# Patient Record
Sex: FEMALE | Race: WHITE
[De-identification: names, ages, dates, MRNs, and addresses within clinical notes are randomized per-mention and may not be internally consistent; named-entity substitution may affect disease eponyms.]

---

## 2024-03-13 ENCOUNTER — HOSPITAL ENCOUNTER
Dept: HOSPITAL 101 - LAB | Age: 59
Discharge: HOME | End: 2024-03-13
Payer: COMMERCIAL

## 2024-03-13 DIAGNOSIS — L40.0: Primary | ICD-10-CM

## 2024-03-13 DIAGNOSIS — Z79.899: ICD-10-CM

## 2024-03-13 LAB
ADD MANUAL DIFF: NO
ALANINE AMINOTRANSFERASE: 26 U/L (ref 14–59)
ALBUMIN GLOBULIN RATIO: 0.9
ALBUMIN LEVEL: 3.5 G/DL (ref 3.4–5)
ALKALINE PHOSPHATASE: 61 U/L (ref 46–116)
ANION GAP: 12.6
ASPARTATE AMINO TRANSFERASE: 20 U/L (ref 15–37)
BLOOD UREA NITROGEN: 13 MG/DL (ref 7–18)
CALCIUM: 8.7 MG/DL (ref 8.5–10.1)
CARBON DIOXIDE: 29.8 MMOL/L (ref 21–32)
CHLORIDE: 104 MMOL/L (ref 98–107)
CHOLESTEROL: 268 MG/DL (ref ?–200)
ESTIMATED GFR (AFRICAN AMERICA: >60 (ref 60–?)
ESTIMATED GFR (NON-AFRICAN AME: >60 (ref 60–?)
GLOBULIN: 4 G/DL
GLUCOSE: 94 MG/DL (ref 74–106)
HDL CHOLESTEROL: 45 MG/DL (ref 40–60)
HEMATOCRIT: 39.6 % (ref 36–48)
HEMOGLOBIN: 13 G/DL (ref 12–16)
IMMATURE GRANULOCYTES ABS AUTO: 0.01 10^3/UL (ref 0–0.03)
IMMATURE GRANULOCYTES PCT AUTO: 0.1 % (ref 0–0.5)
LYMPHOCYTES  ABSOLUTE AUTO: 2.8 10^3/UL (ref 1.2–3.8)
MCV RBC: 94.7 FL (ref 81–99)
MEAN CORPUSCULAR HEMOGLOBIN: 31.1 PG (ref 26.7–34)
MEAN CORPUSCULAR HGB CONC: 32.8 G/DL (ref 29.9–35.2)
PLATELET COUNT: 341 10^3/UL (ref 150–450)
POTASSIUM: 3.4 MMOL/L (ref 3.5–5.1)
RED BLOOD COUNT: 4.18 10^6/UL (ref 4.2–5.4)
SODIUM: 143 MMOL/L (ref 136–145)
TOTAL PROTEIN: 7.5 G/DL (ref 6.4–8.2)
TRIGLYCERIDES: 478 MG/DL (ref ?–150)
VLDL CHOLESTEROL: 95.6 MG/DL
WHITE BLOOD COUNT: 8.1 10^3/UL (ref 4–11)

## 2024-03-13 PROCEDURE — 86480 TB TEST CELL IMMUN MEASURE: CPT

## 2024-03-13 PROCEDURE — 80053 COMPREHEN METABOLIC PANEL: CPT

## 2024-03-13 PROCEDURE — 85025 COMPLETE CBC W/AUTO DIFF WBC: CPT

## 2024-03-13 PROCEDURE — 80061 LIPID PANEL: CPT

## 2024-03-13 PROCEDURE — 36415 COLL VENOUS BLD VENIPUNCTURE: CPT

## 2024-03-13 PROCEDURE — 83721 ASSAY OF BLOOD LIPOPROTEIN: CPT

## 2024-04-15 ENCOUNTER — HOSPITAL ENCOUNTER (OUTPATIENT)
Dept: HOSPITAL 101 - ER | Age: 59
Setting detail: OBSERVATION
LOS: 1 days | Discharge: HOME | End: 2024-04-16
Payer: COMMERCIAL

## 2024-04-15 VITALS — DIASTOLIC BLOOD PRESSURE: 92 MMHG | OXYGEN SATURATION: 100 % | SYSTOLIC BLOOD PRESSURE: 159 MMHG

## 2024-04-15 VITALS — SYSTOLIC BLOOD PRESSURE: 171 MMHG | DIASTOLIC BLOOD PRESSURE: 85 MMHG | OXYGEN SATURATION: 99 %

## 2024-04-15 VITALS — HEART RATE: 96 BPM

## 2024-04-15 VITALS
OXYGEN SATURATION: 95 % | SYSTOLIC BLOOD PRESSURE: 154 MMHG | DIASTOLIC BLOOD PRESSURE: 74 MMHG | TEMPERATURE: 98.1 F | HEART RATE: 72 BPM

## 2024-04-15 VITALS
SYSTOLIC BLOOD PRESSURE: 115 MMHG | DIASTOLIC BLOOD PRESSURE: 72 MMHG | HEART RATE: 88 BPM | TEMPERATURE: 98.24 F | OXYGEN SATURATION: 95 %

## 2024-04-15 VITALS
TEMPERATURE: 97.7 F | OXYGEN SATURATION: 98 % | HEART RATE: 67 BPM | DIASTOLIC BLOOD PRESSURE: 80 MMHG | SYSTOLIC BLOOD PRESSURE: 157 MMHG

## 2024-04-15 VITALS — DIASTOLIC BLOOD PRESSURE: 73 MMHG | HEART RATE: 85 BPM | SYSTOLIC BLOOD PRESSURE: 106 MMHG | OXYGEN SATURATION: 99 %

## 2024-04-15 VITALS
TEMPERATURE: 98.06 F | DIASTOLIC BLOOD PRESSURE: 88 MMHG | SYSTOLIC BLOOD PRESSURE: 153 MMHG | OXYGEN SATURATION: 97 % | HEART RATE: 90 BPM

## 2024-04-15 VITALS — SYSTOLIC BLOOD PRESSURE: 118 MMHG | DIASTOLIC BLOOD PRESSURE: 72 MMHG

## 2024-04-15 VITALS — DIASTOLIC BLOOD PRESSURE: 73 MMHG | SYSTOLIC BLOOD PRESSURE: 112 MMHG

## 2024-04-15 VITALS — SYSTOLIC BLOOD PRESSURE: 143 MMHG | OXYGEN SATURATION: 99 % | HEART RATE: 78 BPM | DIASTOLIC BLOOD PRESSURE: 89 MMHG

## 2024-04-15 VITALS — HEART RATE: 67 BPM | DIASTOLIC BLOOD PRESSURE: 71 MMHG | SYSTOLIC BLOOD PRESSURE: 117 MMHG | OXYGEN SATURATION: 100 %

## 2024-04-15 VITALS — OXYGEN SATURATION: 99 %

## 2024-04-15 VITALS — HEART RATE: 69 BPM | OXYGEN SATURATION: 98 % | DIASTOLIC BLOOD PRESSURE: 76 MMHG | SYSTOLIC BLOOD PRESSURE: 114 MMHG

## 2024-04-15 VITALS — HEART RATE: 82 BPM

## 2024-04-15 VITALS — HEART RATE: 72 BPM | OXYGEN SATURATION: 99 % | SYSTOLIC BLOOD PRESSURE: 150 MMHG | DIASTOLIC BLOOD PRESSURE: 79 MMHG

## 2024-04-15 VITALS — HEART RATE: 103 BPM

## 2024-04-15 VITALS — HEART RATE: 90 BPM

## 2024-04-15 VITALS — HEART RATE: 85 BPM

## 2024-04-15 VITALS
OXYGEN SATURATION: 99 % | TEMPERATURE: 97.8 F | DIASTOLIC BLOOD PRESSURE: 92 MMHG | HEART RATE: 78 BPM | SYSTOLIC BLOOD PRESSURE: 159 MMHG

## 2024-04-15 VITALS — OXYGEN SATURATION: 98 % | DIASTOLIC BLOOD PRESSURE: 78 MMHG | HEART RATE: 95 BPM | SYSTOLIC BLOOD PRESSURE: 122 MMHG

## 2024-04-15 VITALS — OXYGEN SATURATION: 97 %

## 2024-04-15 VITALS — HEART RATE: 80 BPM

## 2024-04-15 VITALS — HEART RATE: 88 BPM

## 2024-04-15 VITALS — OXYGEN SATURATION: 99 % | HEART RATE: 66 BPM

## 2024-04-15 VITALS — BODY MASS INDEX: 25 KG/M2 | BODY MASS INDEX: 26.2 KG/M2

## 2024-04-15 VITALS — OXYGEN SATURATION: 98 %

## 2024-04-15 DIAGNOSIS — I10: ICD-10-CM

## 2024-04-15 DIAGNOSIS — R06.00: ICD-10-CM

## 2024-04-15 DIAGNOSIS — R07.89: Primary | ICD-10-CM

## 2024-04-15 DIAGNOSIS — Z79.899: ICD-10-CM

## 2024-04-15 DIAGNOSIS — R94.6: ICD-10-CM

## 2024-04-15 DIAGNOSIS — Z82.49: ICD-10-CM

## 2024-04-15 LAB
ADD MANUAL DIFF: NO
ANION GAP: 19.7
BLOOD UREA NITROGEN: 17 MG/DL (ref 7–18)
CALCIUM: 9.4 MG/DL (ref 8.5–10.1)
CARBON DIOXIDE: 22 MMOL/L (ref 21–32)
CHLORIDE: 100 MMOL/L (ref 98–107)
ESTIMATED GFR (AFRICAN AMERICA: >60 (ref 60–?)
ESTIMATED GFR (NON-AFRICAN AME: >60 (ref 60–?)
FREE T3: 1.61 PG/ML (ref 2.18–3.98)
GLUCOSE: 82 MG/DL (ref 74–106)
HEMATOCRIT: 42.5 % (ref 36–48)
HEMOGLOBIN: 13.8 G/DL (ref 12–16)
IMMATURE GRANULOCYTES ABS AUTO: 0.04 10^3/UL (ref 0–0.03)
IMMATURE GRANULOCYTES PCT AUTO: 0.4 % (ref 0–0.5)
LYMPHOCYTES  ABSOLUTE AUTO: 2.8 10^3/UL (ref 1.2–3.8)
MAGNESIUM: 2 MG/DL (ref 1.8–2.4)
MCV RBC: 95.3 FL (ref 81–99)
MEAN CORPUSCULAR HEMOGLOBIN: 30.9 PG (ref 26.7–34)
MEAN CORPUSCULAR HGB CONC: 32.5 G/DL (ref 29.9–35.2)
NT PRO B TYPE NATRIURETIC PEPT: 22 PG/ML (ref ?–900)
PLATELET COUNT: 359 10^3/UL (ref 150–450)
POTASSIUM: 3.7 MMOL/L (ref 3.5–5.1)
RED BLOOD COUNT: 4.46 10^6/UL (ref 4.2–5.4)
SODIUM: 138 MMOL/L (ref 136–145)
THYROID STIMULATING HORMONE: 1.54 UIU/ML (ref 0.36–3.74)
WHITE BLOOD COUNT: 9.1 10^3/UL (ref 4–11)

## 2024-04-15 PROCEDURE — 94761 N-INVAS EAR/PLS OXIMETRY MLT: CPT

## 2024-04-15 PROCEDURE — 93005 ELECTROCARDIOGRAM TRACING: CPT

## 2024-04-15 PROCEDURE — 96374 THER/PROPH/DIAG INJ IV PUSH: CPT

## 2024-04-15 PROCEDURE — 83735 ASSAY OF MAGNESIUM: CPT

## 2024-04-15 PROCEDURE — 84436 ASSAY OF TOTAL THYROXINE: CPT

## 2024-04-15 PROCEDURE — 84481 FREE ASSAY (FT-3): CPT

## 2024-04-15 PROCEDURE — 93306 TTE W/DOPPLER COMPLETE: CPT

## 2024-04-15 PROCEDURE — 84443 ASSAY THYROID STIM HORMONE: CPT

## 2024-04-15 PROCEDURE — 96375 TX/PRO/DX INJ NEW DRUG ADDON: CPT

## 2024-04-15 PROCEDURE — 83880 ASSAY OF NATRIURETIC PEPTIDE: CPT

## 2024-04-15 PROCEDURE — 71045 X-RAY EXAM CHEST 1 VIEW: CPT

## 2024-04-15 PROCEDURE — 36415 COLL VENOUS BLD VENIPUNCTURE: CPT

## 2024-04-15 PROCEDURE — 80048 BASIC METABOLIC PNL TOTAL CA: CPT

## 2024-04-15 PROCEDURE — 84484 ASSAY OF TROPONIN QUANT: CPT

## 2024-04-15 PROCEDURE — G0378 HOSPITAL OBSERVATION PER HR: HCPCS

## 2024-04-15 PROCEDURE — 99285 EMERGENCY DEPT VISIT HI MDM: CPT

## 2024-04-15 PROCEDURE — 85025 COMPLETE CBC W/AUTO DIFF WBC: CPT

## 2024-04-15 RX ADMIN — DEXAMETHASONE SODIUM PHOSPHATE 10 MG: 10 INJECTION INTRAMUSCULAR; INTRAVENOUS at 20:00

## 2024-04-15 RX ADMIN — ASPIRIN 81 MG 324 MG: 81 TABLET ORAL at 11:12

## 2024-04-15 RX ADMIN — CARVEDILOL 3.12 MG: 3.12 TABLET, FILM COATED ORAL at 21:19

## 2024-04-15 RX ADMIN — PANTOPRAZOLE SODIUM 40 MG: 40 INJECTION, POWDER, FOR SOLUTION INTRAVENOUS at 20:00

## 2024-04-15 RX ADMIN — NITROGLYCERIN 0.4 MG: 0.4 TABLET SUBLINGUAL at 11:12

## 2024-04-16 VITALS
DIASTOLIC BLOOD PRESSURE: 71 MMHG | HEART RATE: 84 BPM | SYSTOLIC BLOOD PRESSURE: 114 MMHG | TEMPERATURE: 97.52 F | OXYGEN SATURATION: 93 %

## 2024-04-16 VITALS — HEART RATE: 86 BPM

## 2024-04-16 VITALS — HEART RATE: 79 BPM

## 2024-04-16 VITALS — HEART RATE: 82 BPM

## 2024-04-16 VITALS — OXYGEN SATURATION: 94 %

## 2024-04-16 VITALS — HEART RATE: 84 BPM

## 2024-04-16 LAB
ADD MANUAL DIFF: NO
ANION GAP: 15.9
BLOOD UREA NITROGEN: 12 MG/DL (ref 7–18)
CALCIUM: 9.6 MG/DL (ref 8.5–10.1)
CARBON DIOXIDE: 23.2 MMOL/L (ref 21–32)
CHLORIDE: 102 MMOL/L (ref 98–107)
ESTIMATED GFR (AFRICAN AMERICA: >60 (ref 60–?)
ESTIMATED GFR (NON-AFRICAN AME: >60 (ref 60–?)
GLUCOSE: 165 MG/DL (ref 74–106)
HEMATOCRIT: 41.3 % (ref 36–48)
HEMOGLOBIN: 13.6 G/DL (ref 12–16)
IMMATURE GRANULOCYTES ABS AUTO: 0.07 10^3/UL (ref 0–0.03)
IMMATURE GRANULOCYTES PCT AUTO: 0.6 % (ref 0–0.5)
LYMPHOCYTES  ABSOLUTE AUTO: 0.8 10^3/UL (ref 1.2–3.8)
MCV RBC: 94.3 FL (ref 81–99)
MEAN CORPUSCULAR HEMOGLOBIN: 31.1 PG (ref 26.7–34)
MEAN CORPUSCULAR HGB CONC: 32.9 G/DL (ref 29.9–35.2)
PLATELET COUNT: 349 10^3/UL (ref 150–450)
POTASSIUM: 4.1 MMOL/L (ref 3.5–5.1)
RED BLOOD COUNT: 4.38 10^6/UL (ref 4.2–5.4)
SODIUM: 137 MMOL/L (ref 136–145)
WHITE BLOOD COUNT: 11.3 10^3/UL (ref 4–11)

## 2024-04-29 ENCOUNTER — OFFICE VISIT (OUTPATIENT)
Dept: CARDIOLOGY | Facility: CLINIC | Age: 59
End: 2024-04-29
Payer: MEDICAID

## 2024-04-29 VITALS
WEIGHT: 133 LBS | DIASTOLIC BLOOD PRESSURE: 72 MMHG | BODY MASS INDEX: 25.11 KG/M2 | SYSTOLIC BLOOD PRESSURE: 160 MMHG | HEART RATE: 68 BPM | HEIGHT: 61 IN

## 2024-04-29 DIAGNOSIS — L40.9 PSORIASIS: ICD-10-CM

## 2024-04-29 DIAGNOSIS — E78.1 HYPERTRIGLYCERIDEMIA: ICD-10-CM

## 2024-04-29 DIAGNOSIS — R07.89 OTHER CHEST PAIN: ICD-10-CM

## 2024-04-29 DIAGNOSIS — Z78.9 NEVER SMOKED TOBACCO: ICD-10-CM

## 2024-04-29 DIAGNOSIS — R03.0 ELEVATED BP WITHOUT DIAGNOSIS OF HYPERTENSION: ICD-10-CM

## 2024-04-29 PROBLEM — E78.5 HYPERLIPIDEMIA: Status: ACTIVE | Noted: 2024-04-29

## 2024-04-29 PROCEDURE — 93000 ELECTROCARDIOGRAM COMPLETE: CPT | Performed by: INTERNAL MEDICINE

## 2024-04-29 PROCEDURE — 99204 OFFICE O/P NEW MOD 45 MIN: CPT | Performed by: INTERNAL MEDICINE

## 2024-04-29 RX ORDER — GLUCOSAM/CHONDRO/HERB 149/HYAL 750-100 MG
2000 TABLET ORAL 2 TIMES DAILY
Qty: 360 CAPSULE | Refills: 3 | Status: SHIPPED | OUTPATIENT
Start: 2024-04-29 | End: 2025-04-29

## 2024-04-29 RX ORDER — ASPIRIN 81 MG/1
81 TABLET ORAL DAILY
COMMUNITY
Start: 2024-04-16

## 2024-04-29 RX ORDER — GUSELKUMAB 100 MG/ML
100 INJECTION SUBCUTANEOUS
COMMUNITY
Start: 2022-10-25

## 2024-04-29 NOTE — LETTER
April 30, 2024     Rodolfo Bettencourt MD  [address] [facility] [address]  [place] [address]    Patient: Gerda Demarco   YOB: 1965   Date of Visit: 4/29/2024       Dear Dr. Rodolfo Bettencourt MD:    Thank you for referring Gerda Demarco to me for evaluation. Below are my notes for this consultation.  If you have questions, please do not hesitate to call me. I look forward to following your patient along with you.       Sincerely,     Kailey Quezada MD      CC: No Recipients  ______________________________________________________________________________________    Referred by Dr.Michael Bettencourt for New Patient Visit (Chest pain)     History Of Present Illness:    Gerda Demarco is a 58 y.o. female presenting with history of chest pain.  Accompanied by  to the office.  Will patient and  have been modifying their lifestyle, becoming more heart healthy, exercising more, they both lost weight.  2 weeks ago patient noted anterior chest discomfort which she describes as a pressure sensation, lasting several minutes,  says that she looked quite uncomfortable sitting in a chair, for quite some time, she reports that the discomfort then went into her jaw, finally they decided to seek emergent medical attention, cardiac biomarkers were negative EKG was negative, she was observed overnight, on the schedule for an outpatient stress test which is to be done on 5/7/2025, and the diagnosis they gave me is she had pleurisy.  Chest pain was not sharp and it was not worse with deep inspiration,  reports that the chest pain will get better when she laid down and it was worse when she sat up or leaning forward.  No unaccustomed physical activity history.  Has history of psoriasis and hypertriglyceridemia.  No family history of premature coronary artery disease  Reviewed notes by primary MD Dr. Kasper dated 4/24/2025.  Reviewed recent laboratory data, hemoglobin 13.8 hematocrit 41 platelets 349  "glucose 165 sodium 137 potassium 4.1 GFR greater than 60, these labs were from 2024.  NT proBNP level was 22 troponin was negative .  Free T31.61 Which is below normal, reference range being 2.18-3.98, magnesium 2.0.  T4 was normal at 7.7 TSH 1.54 LDL cholesterol 97, triglycerides 478 total cholesterol 268 HDL 45 total cholesterol to HDL ratio 6.0    Echocardiogram was reported to show normal LVEF of 60 to 65% grossly normal valves normal right-sided pressures and no pericardial effusion..      Past Medical History:  She has no past medical history on file.    Past Surgical History:  She has a past surgical history that includes  section, classic.      Social History:  She reports that she has never smoked. She has never used smokeless tobacco. She reports that she does not currently use alcohol. She reports that she does not use drugs.    Family History:  Family History   Problem Relation Name Age of Onset   • Breast cancer Mother     • Diabetes Mother     • Dementia Father     • Kidney failure Father     • Migraines Sister     • Hypertension Sister          Allergies:  Patient has no known allergies.    Outpatient Medications:  Current Outpatient Medications   Medication Instructions   • aspirin 81 mg, oral, Daily   • omega 3-dha-epa-fish oil (Fish OiL) 1,000 mg (120 mg-180 mg) capsule 2,000 mg, oral, 2 times daily   • Tremfya 100 mg, subcutaneous, Every 8 weeks        Last Recorded Vitals:  Vitals:    24 1529 24 1533 24 1640 24 1641   BP: (!) 190/100 160/90 154/72 160/72   BP Location: Left arm Right arm Right arm Left arm   Patient Position: Sitting Sitting Sitting Sitting   Pulse: 68 68     Weight: 60.3 kg (133 lb)      Height: 1.549 m (5' 1\")          Physical Exam:    GENERAL APPEARANCE: Well developed, well nourished, in no acute distress.  CHEST: Symmetric and non-tender.  INTEGUMENT: Skin warm and dry, without gross excoriationis or lesions.  HEENT: No gross " abnormalities, no jugular venous distention no carotid bruit or scleral icterus  NECK: Supple, no JVD, no bruit. Thyroid not palpable. Carotid upstrokes normal.  NEURO/PSHCY: Alert and oriented x3; appropriate behavior and responses and responses, with normal balance and coordination  LUNGS: Clear to auscultation bilaterally; normal respiratory effort.  HEART: Rate and rhythm regular with no evident murmur; no gallop appreciated. There are no rubs, clicks or heaves.   ABDOMEN: Soft, nontender, no masses  or bruits.   MUSCULOSKELETAL: No obvious deformity identified  EXTREMITIES: Warm  There is no edema noted.  PERIPHERAL VASCULAR: Pulses present and equally palpable; 2+ throughout.          Labs reviewed today : CBC, basic metabolic profile, thyroid function panel, lipid profile and comprehensive profile.    Assessment/Plan  Diagnoses and all orders for this visit:  Other chest pain  -     Follow Up In Cardiology; Future  -     ECG 12 Lead  BMI 25.0-25.9,adult  Never smoked tobacco  Hypertriglyceridemia  -     omega 3-dha-epa-fish oil (Fish OiL) 1,000 mg (120 mg-180 mg) capsule; Take 2 capsules (2,000 mg) by mouth 2 times a day.  Psoriasis  Elevated BP without diagnosis of hypertension      1. Other chest pain  Follow Up In Cardiology    ECG 12 Lead      2. BMI 25.0-25.9,adult        3. Never smoked tobacco        4. Hypertriglyceridemia  omega 3-dha-epa-fish oil (Fish OiL) 1,000 mg (120 mg-180 mg) capsule      5. Psoriasis        6. Elevated BP without diagnosis of hypertension           Clinical decision makin.  Postmenopausal female with chest discomfort that has several features of angina pectoris, without objective evidence of ischemia.  2.  Her symptoms have not recurred  3.  Her echo is reported to be normal, done on 4/15/2024.  4.  She has elevated triglycerides  5.  She is on immune modulating agent for psoriasis.  6.  Her blood pressure is elevated, rechecked at 160s over 70s, no significant  difference between upper extremities, she reports that home blood pressures are in the 120s systolic, and that she has whitecoat hypertension.    I recommendations are that she continue to monitor her blood pressures outside the office environment, she should start fish oil 2 g p.o. twice daily as tolerated, she should undergo her treadmill stress test as already scheduled, and I will see her in follow-up after testing.  She understands the dynamic nature of coronary artery disease and the importance of seeking prompt medical attention if symptoms recur or change.  Clinically there is no suspicion for pericarditis or pleurisy.    We also talked about coronary calcium scoring, I do not know how much that test will add to our medical management.    Thank you for allowing me to participate in Gerda's care, please do not hesitate to call if further questions arise,  Sincerely,    Kailey Quezada MD [facility]  Provider Attestation - Scribe documentation    All medical record entries made by the Scribe were at my direction and personally dictated by me. I have reviewed the chart and agree that the record accurately reflects my personal performance of the history, physical exam, discussion and plan.

## 2024-04-29 NOTE — PROGRESS NOTES
Referred by Dr.Michael Bettencourt for New Patient Visit (Chest pain)     History Of Present Illness:    Gerda Demraco is a 58 y.o. female presenting with history of chest pain.  Accompanied by  to the office.  Will patient and  have been modifying their lifestyle, becoming more heart healthy, exercising more, they both lost weight.  2 weeks ago patient noted anterior chest discomfort which she describes as a pressure sensation, lasting several minutes,  says that she looked quite uncomfortable sitting in a chair, for quite some time, she reports that the discomfort then went into her jaw, finally they decided to seek emergent medical attention, cardiac biomarkers were negative EKG was negative, she was observed overnight, on the schedule for an outpatient stress test which is to be done on 5/7/2025, and the diagnosis they gave me is she had pleurisy.  Chest pain was not sharp and it was not worse with deep inspiration,  reports that the chest pain will get better when she laid down and it was worse when she sat up or leaning forward.  No unaccustomed physical activity history.  Has history of psoriasis and hypertriglyceridemia.  No family history of premature coronary artery disease  Reviewed notes by primary MD Dr. Kasper dated 4/24/2025.  Reviewed recent laboratory data, hemoglobin 13.8 hematocrit 41 platelets 349 glucose 165 sodium 137 potassium 4.1 GFR greater than 60, these labs were from April 16, 2024.  NT proBNP level was 22 troponin was negative .  Free T31.61 Which is below normal, reference range being 2.18-3.98, magnesium 2.0.  T4 was normal at 7.7 TSH 1.54 LDL cholesterol 97, triglycerides 478 total cholesterol 268 HDL 45 total cholesterol to HDL ratio 6.0    Echocardiogram was reported to show normal LVEF of 60 to 65% grossly normal valves normal right-sided pressures and no pericardial effusion..    No history of fever chills cough, no unaccustomed weight loss or weight gain, no  "recent travel or trauma, no prior history of DVT or pulmonary embolism, 12 point review of systems is otherwise negative or noncontributory.  Past Medical History:  She has no past medical history on file.    Past Surgical History:  She has a past surgical history that includes  section, classic.      Social History:  She reports that she has never smoked. She has never used smokeless tobacco. She reports that she does not currently use alcohol. She reports that she does not use drugs.    Family History:  Family History   Problem Relation Name Age of Onset    Breast cancer Mother      Diabetes Mother      Dementia Father      Kidney failure Father      Migraines Sister      Hypertension Sister          Allergies:  Patient has no known allergies.    Outpatient Medications:  Current Outpatient Medications   Medication Instructions    aspirin 81 mg, oral, Daily    omega 3-dha-epa-fish oil (Fish OiL) 1,000 mg (120 mg-180 mg) capsule 2,000 mg, oral, 2 times daily    Tremfya 100 mg, subcutaneous, Every 8 weeks        Last Recorded Vitals:  Vitals:    24 1529 24 1533 24 1640 24 1641   BP: (!) 190/100 160/90 154/72 160/72   BP Location: Left arm Right arm Right arm Left arm   Patient Position: Sitting Sitting Sitting Sitting   Pulse: 68 68     Weight: 60.3 kg (133 lb)      Height: 1.549 m (5' 1\")          Physical Exam:    GENERAL APPEARANCE: Well developed, well nourished, in no acute distress.  CHEST: Symmetric and non-tender.  INTEGUMENT: Skin warm and dry, without gross excoriationis or lesions.  HEENT: No gross abnormalities, no jugular venous distention no carotid bruit or scleral icterus  NECK: Supple, no JVD, no bruit. Thyroid not palpable. Carotid upstrokes normal.  NEURO/PSHCY: Alert and oriented x3; appropriate behavior and responses and responses, with normal balance and coordination  LUNGS: Clear to auscultation bilaterally; normal respiratory effort.  HEART: Rate and rhythm " regular with no evident murmur; no gallop appreciated. There are no rubs, clicks or heaves.   ABDOMEN: Soft, nontender, no masses  or bruits.   MUSCULOSKELETAL: No obvious deformity identified  EXTREMITIES: Warm  There is no edema noted.  PERIPHERAL VASCULAR: Pulses present and equally palpable; 2+ throughout.          Labs reviewed today : CBC, basic metabolic profile, thyroid function panel, lipid profile and comprehensive profile.    Assessment/Plan   Diagnoses and all orders for this visit:  Other chest pain  -     Follow Up In Cardiology; Future  -     ECG 12 Lead  BMI 25.0-25.9,adult  Never smoked tobacco  Hypertriglyceridemia  -     omega 3-dha-epa-fish oil (Fish OiL) 1,000 mg (120 mg-180 mg) capsule; Take 2 capsules (2,000 mg) by mouth 2 times a day.  Psoriasis  Elevated BP without diagnosis of hypertension      1. Other chest pain  Follow Up In Cardiology    ECG 12 Lead      2. BMI 25.0-25.9,adult        3. Never smoked tobacco        4. Hypertriglyceridemia  omega 3-dha-epa-fish oil (Fish OiL) 1,000 mg (120 mg-180 mg) capsule      5. Psoriasis        6. Elevated BP without diagnosis of hypertension           Clinical decision makin.  Postmenopausal female with chest discomfort that has several features of angina pectoris, without objective evidence of ischemia.  2.  Her symptoms have not recurred  3.  Her echo is reported to be normal, done on 4/15/2024.  4.  She has elevated triglycerides  5.  She is on immune modulating agent for psoriasis.  6.  Her blood pressure is elevated, rechecked at 160s over 70s, no significant difference between upper extremities, she reports that home blood pressures are in the 120s systolic, and that she has whitecoat hypertension.    I recommendations are that she continue to monitor her blood pressures outside the office environment, she should start fish oil 2 g p.o. twice daily as tolerated, she should undergo her treadmill stress test as already scheduled, and I will  see her in follow-up after testing.  She understands the dynamic nature of coronary artery disease and the importance of seeking prompt medical attention if symptoms recur or change.  Clinically there is no suspicion for pericarditis or pleurisy.    We also talked about coronary calcium scoring, I do not know how much that test will add to our medical management.    Thank you for allowing me to participate in Gerda's care, please do not hesitate to call if further questions arise,  Sincerely,    Kailey Quezada MD [facility]  Provider Attestation - Scribe documentation    All medical record entries made by the Scribe were at my direction and personally dictated by me. I have reviewed the chart and agree that the record accurately reflects my personal performance of the history, physical exam, discussion and plan.

## 2024-05-07 ENCOUNTER — HOSPITAL ENCOUNTER
Dept: HOSPITAL 101 - NM | Age: 59
Discharge: HOME | End: 2024-05-07
Payer: COMMERCIAL

## 2024-05-07 DIAGNOSIS — R07.9: Primary | ICD-10-CM

## 2024-05-07 PROCEDURE — A9500 TC99M SESTAMIBI: HCPCS

## 2024-05-07 PROCEDURE — 93017 CV STRESS TEST TRACING ONLY: CPT

## 2024-05-07 PROCEDURE — 78452 HT MUSCLE IMAGE SPECT MULT: CPT

## 2024-08-09 ENCOUNTER — TELEPHONE (OUTPATIENT)
Dept: CARDIOLOGY | Facility: CLINIC | Age: 59
End: 2024-08-09
Payer: MEDICAID

## 2024-08-09 NOTE — TELEPHONE ENCOUNTER
Patient called today stating she received a couple phone calls to reschedule her canceled appointment with Dr. Kailey Quezada MD. Patient advised she had a stress test done after her initial visit with Dr. Kailey Quezada MD and results were normal. She does not feel she needs to follow up at this time.

## 2024-08-14 ENCOUNTER — HOSPITAL ENCOUNTER
Dept: HOSPITAL 101 - LAB | Age: 59
Discharge: HOME | End: 2024-08-14
Payer: COMMERCIAL

## 2024-08-14 DIAGNOSIS — E78.1: Primary | ICD-10-CM

## 2024-08-14 LAB
ALANINE AMINOTRANSFERASE: 23 U/L (ref 14–59)
ALBUMIN GLOBULIN RATIO: 1.1
ALBUMIN LEVEL: 3.7 G/DL (ref 3.4–5)
ALKALINE PHOSPHATASE: 48 U/L (ref 46–116)
ASPARTATE AMINO TRANSFERASE: 18 U/L (ref 15–37)
CHOLESTEROL: 217 MG/DL (ref ?–200)
GLOBULIN: 3.3 G/DL
HDL CHOLESTEROL: 55 MG/DL (ref 40–60)
TOTAL PROTEIN: 7 G/DL (ref 6.4–8.2)
TRIGLYCERIDES: 108 MG/DL (ref ?–150)
VLDL CHOLESTEROL: 21.6 MG/DL

## 2024-08-14 PROCEDURE — 80076 HEPATIC FUNCTION PANEL: CPT

## 2024-08-14 PROCEDURE — 80061 LIPID PANEL: CPT

## 2024-08-14 PROCEDURE — 36415 COLL VENOUS BLD VENIPUNCTURE: CPT

## 2024-08-22 ENCOUNTER — APPOINTMENT (OUTPATIENT)
Dept: CARDIOLOGY | Facility: CLINIC | Age: 59
End: 2024-08-22
Payer: MEDICAID

## 2025-07-08 RX ORDER — CALCIUM CARBONATE/VITAMIN D3 600MG-62.5
2 CAPSULE ORAL 2 TIMES DAILY
Qty: 120 CAPSULE | Refills: 3 | OUTPATIENT
Start: 2025-07-08

## 2025-07-15 ENCOUNTER — HOSPITAL ENCOUNTER
Dept: HOSPITAL 101 - LAB | Age: 60
Discharge: HOME | End: 2025-07-15
Payer: COMMERCIAL

## 2025-07-15 DIAGNOSIS — Z00.00: Primary | ICD-10-CM

## 2025-07-15 LAB
ADD MANUAL DIFF: NO
ALANINE AMINOTRANSFERASE: 24 U/L (ref 14–59)
ALBUMIN GLOBULIN RATIO: 1.1
ALBUMIN LEVEL: 3.6 G/DL (ref 3.4–5)
ALKALINE PHOSPHATASE: 53 U/L (ref 46–116)
ANION GAP: 11.5
ASPARTATE AMINO TRANSFERASE: 23 U/L (ref 15–37)
BASOPHILS # BLD AUTO: 0 10^3/UL (ref 0–0.1)
BASOPHILS NFR BLD AUTO: 0.4 % (ref 0.2–2)
BILIRUBIN TOTAL: 0.5 MG/DL (ref 0.2–1)
BUN SERPL-MCNC: 17 MG/DL (ref 7–18)
BUN/CREAT SERPL: 23.3
CALCIUM: 9.1 MG/DL (ref 8.5–10.1)
CHLORIDE: 108 MMOL/L (ref 98–107)
CHOL HDL RATIO: 5.3
CHOLESTEROL: 254 MG/DL (ref ?–200)
CO2 BLD-SCNC: 29.7 MMOL/L (ref 21–32)
CREAT SERPL-SCNC: 0.73 MG/DL (ref 0.55–1.02)
EOSINOPHIL # BLD AUTO: 0.1 10^3/UL (ref 0–0.7)
EOSINOPHIL NFR BLD AUTO: 1.9 % (ref 0.9–7)
ERYTHROCYTE [DISTWIDTH] IN BLOOD BY AUTOMATED COUNT: 12.7 % (ref 11–15)
ESTIMATED AVERAGE GLUCOSE: 111 MG/DL
ESTIMATED GFR (AFRICAN AMERICA: >60
ESTIMATED GFR (NON-AFRICAN AME: >60
FREE T4: 0.88 NG/DL (ref 0.76–1.46)
GLOBULIN: 3.3 G/DL
GLUCOSE SERPLBLD-MCNC: 97 MG/DL (ref 74–106)
GLYCOHEMOGLOBIN A1C: 5.5 % (ref 4.5–6.2)
HCT VFR BLD CALC: 39.9 % (ref 36–48)
HDL CHOLESTEROL: 48 MG/DL (ref 40–60)
HEMOGLOBIN: 13 G/DL (ref 12–16)
IMMATURE GRANULOCYTES ABS AUTO: 0.01 10^3/UL (ref 0–0.03)
IMMATURE GRANULOCYTES PCT AUTO: 0.1 % (ref 0–0.5)
IRON: 86 UG/DL (ref 50–170)
LDL CHOLESTEROL CALCULATED: 162 MG/DL
LYMPHOCYTES  ABSOLUTE AUTO: 2.8 10^3/UL (ref 1.2–3.8)
LYMPHOCYTES PERCENT AUTO: 39.3 % (ref 20.5–60)
MCH RBC QN AUTO: 30.9 PG (ref 26.7–34)
MCV RBC: 94.8 FL (ref 81–99)
MEAN CORPUSCULAR HGB CONC: 32.6 G/DL (ref 29.9–35.2)
MEAN PLATELET VOLUME: 10.2 FL (ref 9.5–13.5)
MONOCYTES # BLD AUTO: 0.7 10^3/UL (ref 0.3–0.8)
MONOCYTES NFR BLD AUTO: 9.3 % (ref 1.7–12)
NEUTROPHILS # BLD AUTO: 3.4 10^3/UL (ref 1.4–6.5)
NEUTROPHILS NFR BLD AUTO: 49 % (ref 43–75)
PLATELET # BLD: 338 10^3/UL (ref 150–450)
POTASSIUM SERPLBLD-SCNC: 4.2 MMOL/L (ref 3.5–5.1)
RBC # BLD AUTO: 4.21 10^6/UL (ref 4.2–5.4)
SODIUM BLD-SCNC: 145 MMOL/L (ref 136–145)
T4 THYROXINE: 6.1 UG/DL (ref 4.8–13.9)
THYROID STIMULATING HORMONE: 1.96 UIU/ML (ref 0.36–3.74)
TOTAL PROTEIN: 6.9 G/DL (ref 6.4–8.2)
TRIGLYCERIDES: 221 MG/DL (ref ?–150)
VITAMIN D: 49.6 NG/ML
VLDL CHOLESTEROL: 44.2 MG/DL
WBC # BLD: 7 10^3/UL (ref 4–11)

## 2025-07-15 PROCEDURE — 85025 COMPLETE CBC W/AUTO DIFF WBC: CPT

## 2025-07-15 PROCEDURE — 83540 ASSAY OF IRON: CPT

## 2025-07-15 PROCEDURE — 80053 COMPREHEN METABOLIC PANEL: CPT

## 2025-07-15 PROCEDURE — 80061 LIPID PANEL: CPT

## 2025-07-15 PROCEDURE — 82306 VITAMIN D 25 HYDROXY: CPT

## 2025-07-15 PROCEDURE — 36415 COLL VENOUS BLD VENIPUNCTURE: CPT

## 2025-07-15 PROCEDURE — 84443 ASSAY THYROID STIM HORMONE: CPT

## 2025-07-15 PROCEDURE — 84436 ASSAY OF TOTAL THYROXINE: CPT

## 2025-07-15 PROCEDURE — 83036 HEMOGLOBIN GLYCOSYLATED A1C: CPT

## 2025-07-15 PROCEDURE — 84439 ASSAY OF FREE THYROXINE: CPT
